# Patient Record
Sex: FEMALE | Race: AMERICAN INDIAN OR ALASKA NATIVE
[De-identification: names, ages, dates, MRNs, and addresses within clinical notes are randomized per-mention and may not be internally consistent; named-entity substitution may affect disease eponyms.]

---

## 2020-08-18 ENCOUNTER — HOSPITAL ENCOUNTER (EMERGENCY)
Dept: HOSPITAL 5 - ED | Age: 20
Discharge: HOME | End: 2020-08-18
Payer: COMMERCIAL

## 2020-08-18 VITALS — DIASTOLIC BLOOD PRESSURE: 82 MMHG | SYSTOLIC BLOOD PRESSURE: 122 MMHG
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
## 2020-08-18 DIAGNOSIS — Z91.013: ICD-10-CM
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
## 2020-08-18 DIAGNOSIS — Z79.899: ICD-10-CM

## 2020-08-18 DIAGNOSIS — S16.1XXA: ICD-10-CM

## 2020-08-18 DIAGNOSIS — Z79.1: ICD-10-CM

## 2020-08-18 DIAGNOSIS — S93.402A: Primary | ICD-10-CM

## 2020-08-18 DIAGNOSIS — S39.012A: ICD-10-CM
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
